# Patient Record
(demographics unavailable — no encounter records)

---

## 2024-10-16 NOTE — PLAN
Follow up in 2 weeks
[FreeTextEntry1] : HTN- BP high today. will change meds to amlodipine-Olmesartan 5-20mg. Encouraged pt to monitor BP at home.  Elevated A1C last visit- recheck bw  f/u 3 mths

## 2024-10-16 NOTE — HISTORY OF PRESENT ILLNESS
[FreeTextEntry1] : 60 yo female here for follow, to check BP. Taking amlodipine 5 mg. Has not been taking BPs at home.